# Patient Record
Sex: FEMALE | Race: WHITE | NOT HISPANIC OR LATINO | Employment: UNEMPLOYED | URBAN - METROPOLITAN AREA
[De-identification: names, ages, dates, MRNs, and addresses within clinical notes are randomized per-mention and may not be internally consistent; named-entity substitution may affect disease eponyms.]

---

## 2017-02-03 RX ORDER — LEVOTHYROXINE SODIUM 0.05 MG/1
TABLET ORAL
Qty: 30 TABLET | Refills: 0 | Status: SHIPPED | OUTPATIENT
Start: 2017-02-03 | End: 2017-04-07

## 2017-02-03 RX ORDER — SERTRALINE HYDROCHLORIDE 100 MG/1
TABLET, FILM COATED ORAL
Qty: 30 TABLET | Refills: 0 | Status: SHIPPED | OUTPATIENT
Start: 2017-02-03 | End: 2017-03-01 | Stop reason: SDUPTHER

## 2017-02-08 RX ORDER — LEVOTHYROXINE SODIUM 0.15 MG/1
150 TABLET ORAL DAILY
Qty: 30 TABLET | Refills: 2 | Status: SHIPPED | OUTPATIENT
Start: 2017-02-08 | End: 2017-04-07 | Stop reason: SDUPTHER

## 2017-02-08 NOTE — TELEPHONE ENCOUNTER
----- Message from Ciera Murphy sent at 2/8/2017 11:36 AM EST -----  Contact: [phone]  SYNTHROID WAS SENT IN AS 50 MG AND WAS SUPPOSE TO  MG.  CAN YOU FIX?  NEEDS ASAP    [facility] [phone]

## 2017-03-01 RX ORDER — LISINOPRIL AND HYDROCHLOROTHIAZIDE 25; 20 MG/1; MG/1
TABLET ORAL
Qty: 30 TABLET | Refills: 0 | Status: SHIPPED | OUTPATIENT
Start: 2017-03-01 | End: 2017-04-07 | Stop reason: SDUPTHER

## 2017-03-01 RX ORDER — SERTRALINE HYDROCHLORIDE 100 MG/1
TABLET, FILM COATED ORAL
Qty: 30 TABLET | Refills: 0 | Status: SHIPPED | OUTPATIENT
Start: 2017-03-01 | End: 2017-04-07 | Stop reason: SDUPTHER

## 2017-04-07 ENCOUNTER — TRANSCRIBE ORDERS (OUTPATIENT)
Dept: ADMINISTRATIVE | Facility: HOSPITAL | Age: 62
End: 2017-04-07

## 2017-04-07 ENCOUNTER — OFFICE VISIT (OUTPATIENT)
Dept: INTERNAL MEDICINE | Facility: CLINIC | Age: 62
End: 2017-04-07

## 2017-04-07 VITALS
SYSTOLIC BLOOD PRESSURE: 130 MMHG | BODY MASS INDEX: 31.78 KG/M2 | TEMPERATURE: 98.2 F | HEART RATE: 75 BPM | DIASTOLIC BLOOD PRESSURE: 74 MMHG | OXYGEN SATURATION: 97 % | WEIGHT: 209 LBS | RESPIRATION RATE: 16 BRPM

## 2017-04-07 DIAGNOSIS — R73.09 ELEVATED GLUCOSE: ICD-10-CM

## 2017-04-07 DIAGNOSIS — Z12.31 VISIT FOR SCREENING MAMMOGRAM: Primary | ICD-10-CM

## 2017-04-07 DIAGNOSIS — I10 ESSENTIAL HYPERTENSION: ICD-10-CM

## 2017-04-07 DIAGNOSIS — M25.532 LEFT WRIST PAIN: ICD-10-CM

## 2017-04-07 DIAGNOSIS — E03.9 HYPOTHYROIDISM, UNSPECIFIED TYPE: ICD-10-CM

## 2017-04-07 DIAGNOSIS — J32.0 MAXILLARY SINUSITIS, UNSPECIFIED CHRONICITY: Primary | ICD-10-CM

## 2017-04-07 LAB
ANION GAP SERPL CALCULATED.3IONS-SCNC: 2 MMOL/L (ref 3–11)
BUN BLD-MCNC: 13 MG/DL (ref 9–23)
BUN/CREAT SERPL: 16.3 (ref 7–25)
CALCIUM SPEC-SCNC: 9.7 MG/DL (ref 8.7–10.4)
CHLORIDE SERPL-SCNC: 96 MMOL/L (ref 99–109)
CO2 SERPL-SCNC: 33 MMOL/L (ref 20–31)
CREAT BLD-MCNC: 0.8 MG/DL (ref 0.6–1.3)
DEPRECATED RDW RBC AUTO: 44.6 FL (ref 37–54)
ERYTHROCYTE [DISTWIDTH] IN BLOOD BY AUTOMATED COUNT: 13.5 % (ref 11.3–14.5)
GFR SERPL CREATININE-BSD FRML MDRD: 73 ML/MIN/1.73
GLUCOSE BLD-MCNC: 136 MG/DL (ref 70–100)
HBA1C MFR BLD: 5.8 % (ref 4.8–5.6)
HCT VFR BLD AUTO: 43.7 % (ref 34.5–44)
HGB BLD-MCNC: 14.8 G/DL (ref 11.5–15.5)
MCH RBC QN AUTO: 30.7 PG (ref 27–31)
MCHC RBC AUTO-ENTMCNC: 33.9 G/DL (ref 32–36)
MCV RBC AUTO: 90.7 FL (ref 80–99)
PLATELET # BLD AUTO: 282 10*3/MM3 (ref 150–450)
PMV BLD AUTO: 9.7 FL (ref 6–12)
POTASSIUM BLD-SCNC: 4.5 MMOL/L (ref 3.5–5.5)
RBC # BLD AUTO: 4.82 10*6/MM3 (ref 3.89–5.14)
SODIUM BLD-SCNC: 131 MMOL/L (ref 132–146)
T4 FREE SERPL-MCNC: 1.72 NG/DL (ref 0.89–1.76)
TSH SERPL DL<=0.05 MIU/L-ACNC: 0.43 MIU/ML (ref 0.35–5.35)
WBC NRBC COR # BLD: 10.69 10*3/MM3 (ref 3.5–10.8)

## 2017-04-07 PROCEDURE — 83036 HEMOGLOBIN GLYCOSYLATED A1C: CPT | Performed by: PHYSICIAN ASSISTANT

## 2017-04-07 PROCEDURE — 85027 COMPLETE CBC AUTOMATED: CPT | Performed by: PHYSICIAN ASSISTANT

## 2017-04-07 PROCEDURE — 84443 ASSAY THYROID STIM HORMONE: CPT | Performed by: PHYSICIAN ASSISTANT

## 2017-04-07 PROCEDURE — 80048 BASIC METABOLIC PNL TOTAL CA: CPT | Performed by: PHYSICIAN ASSISTANT

## 2017-04-07 PROCEDURE — 36415 COLL VENOUS BLD VENIPUNCTURE: CPT | Performed by: PHYSICIAN ASSISTANT

## 2017-04-07 PROCEDURE — 84439 ASSAY OF FREE THYROXINE: CPT | Performed by: PHYSICIAN ASSISTANT

## 2017-04-07 PROCEDURE — 99213 OFFICE O/P EST LOW 20 MIN: CPT | Performed by: PHYSICIAN ASSISTANT

## 2017-04-07 RX ORDER — LEVOTHYROXINE SODIUM 0.15 MG/1
150 TABLET ORAL DAILY
Qty: 30 TABLET | Refills: 11 | Status: SHIPPED | OUTPATIENT
Start: 2017-04-07

## 2017-04-07 RX ORDER — CEFDINIR 300 MG/1
300 CAPSULE ORAL 2 TIMES DAILY
Qty: 20 CAPSULE | Refills: 0 | Status: SHIPPED | OUTPATIENT
Start: 2017-04-07 | End: 2017-04-20 | Stop reason: SDUPTHER

## 2017-04-07 RX ORDER — CHLORCYCLIZINE HYDROCHLORIDE AND PSEUDOEPHEDRINE HYDROCHLORIDE 25; 60 MG/1; MG/1
1 TABLET ORAL DAILY
Refills: 0 | COMMUNITY
Start: 2017-04-04 | End: 2017-05-12

## 2017-04-07 RX ORDER — SERTRALINE HYDROCHLORIDE 100 MG/1
100 TABLET, FILM COATED ORAL DAILY
Qty: 30 TABLET | Refills: 5 | Status: SHIPPED | OUTPATIENT
Start: 2017-04-07 | End: 2017-08-18 | Stop reason: SDUPTHER

## 2017-04-07 RX ORDER — LISINOPRIL AND HYDROCHLOROTHIAZIDE 25; 20 MG/1; MG/1
1 TABLET ORAL DAILY
Qty: 30 TABLET | Refills: 5 | Status: SHIPPED | OUTPATIENT
Start: 2017-04-07 | End: 2017-08-18 | Stop reason: SDUPTHER

## 2017-04-07 NOTE — PROGRESS NOTES
Subjective   Carolyn Ragsdale is a 61 y.o. female.   Chief Complaint   Patient presents with   • Cough   • URI   • Med Refill     History of Present Illness   Pt complains of cough that is productive with colored sputum.  Has dental and facial pain x several weeks.    Had bronchitis several months ago and couldn't breath so stopped smoking 2 months ago.    PT complains of left wrist and elbow pain for several weeks.  There is some numbness and tingling.  Denies dropping things.    Needs refills on levothyroxine, zoloft and lisinopril (HTN, hypothyroidism and depression) all controlled.    The following portions of the patient's history were reviewed and updated as appropriate: allergies, current medications and problem list.    Review of Systems   HENT: Positive for congestion.    Respiratory: Positive for cough.        Objective   Physical Exam   Constitutional: She appears well-developed and well-nourished.   HENT:   Head: Normocephalic and atraumatic.   Right Ear: Tympanic membrane and external ear normal.   Left Ear: Tympanic membrane and external ear normal.   Nose: Right sinus exhibits maxillary sinus tenderness and frontal sinus tenderness. Left sinus exhibits maxillary sinus tenderness and frontal sinus tenderness.   Mouth/Throat: No posterior oropharyngeal erythema. No tonsillar exudate.   Eyes: Conjunctivae are normal.   Cardiovascular: Normal rate, regular rhythm and normal heart sounds.  Exam reveals no gallop and no friction rub.    No murmur heard.  Pulmonary/Chest: Effort normal and breath sounds normal.   Psychiatric: She has a normal mood and affect.   Vitals reviewed.      Assessment/Plan   Carolyn was seen today for cough, uri and med refill.    Diagnoses and all orders for this visit:    Maxillary sinusitis, unspecified chronicity  -     cefdinir (OMNICEF) 300 MG capsule; Take 1 capsule by mouth 2 (Two) Times a Day.    Left wrist pain  -     Elastic Bandages & Supports (WRIST BRACE/LEFT MEDIUM)  misc; 1 Units Daily.    Essential hypertension  -     lisinopril-hydrochlorothiazide (PRINZIDE,ZESTORETIC) 20-25 MG per tablet; Take 1 tablet by mouth Daily.    Hypothyroidism, unspecified type  -     TSH  -     T4, Free  -     Basic Metabolic Panel  -     CBC (No Diff)    Other orders  -     sertraline (ZOLOFT) 100 MG tablet; Take 1 tablet by mouth Daily.  -     levothyroxine (SYNTHROID, LEVOTHROID) 150 MCG tablet; Take 1 tablet by mouth Daily.    Gave info on wrist and elbow exercises.  She will wear brace at night.

## 2017-04-07 NOTE — PATIENT INSTRUCTIONS
Generic Wrist Exercises  RANGE OF MOTION (ROM) AND STRETCHING EXERCISES - Wrist Sprain   These exercises may help you when beginning to rehabilitate your injury. Your symptoms may resolve with or without further involvement from your physician, physical therapist, or . While completing these exercises, remember:   · Restoring tissue flexibility helps normal motion to return to the joints. This allows healthier, less painful movement and activity.  · An effective stretch should be held for at least 30 seconds.  · A stretch should never be painful. You should only feel a gentle lengthening or release in the stretched tissue.  RANGE OF MOTION - Wrist Flexion, Active-Assisted  · Extend your right / left elbow with your palm pointing down.*  · Gently pull the back of your hand toward you until you feel a gentle stretch on the top of your forearm.  · Hold this position for __________ seconds.  Repeat __________ times. Complete this exercise __________ times per day.   *If directed by your physician, physical therapist, or , complete this stretch with your elbow bent rather than extended.  RANGE OF MOTION - Wrist Extension, Active-Assisted   · Extend your right / left elbow and turn your palm upward.*  · Gently pull your palm/fingertips back so your wrist extends and your fingers point more toward the ground.  · You should feel a gentle stretch on the inside of your forearm.  · Hold this position for __________ seconds.  Repeat __________ times. Complete this exercise __________ times per day.  *If directed by your physician, physical therapist, or , complete this stretch with your elbow bent, rather than extended.  RANGE OF MOTION - Supination, Active   · Stand or sit with your elbows at your side. Bend your right / left elbow to 90 degrees.  · Turn your palm upward until you feel a gentle stretch on the inside of your forearm.  · Hold this position for __________ seconds.  Slowly release and return to the starting position.  Repeat __________ times. Complete this stretch __________ times per day.   RANGE OF MOTION - Pronation, Active   · Stand or sit with your elbows at your side. Bend your right / left elbow to 90 degrees.  · Turn your palm downward until you feel a gentle stretch on the top of your forearm.  · Hold this position for __________ seconds. Slowly release and return to the starting position.  Repeat __________ times. Complete this stretch __________ times per day.   STRENGTHENING EXERCISES   These exercises may help you when beginning to rehabilitate your injury. They may resolve your symptoms with or without further involvement from your physician, physical therapist, or . While completing these exercises, remember:   · Muscles can gain both the endurance and the strength needed for everyday activities through controlled exercises.  · Complete these exercises as instructed by your physician, physical therapist, or . Progress the resistance and repetitions only as guided.  · You may experience muscle soreness or fatigue, but the pain or discomfort you are trying to eliminate should never worsen during these exercises. If this pain does worsen, stop and make certain you are following the directions exactly. If the pain is still present after adjustments, discontinue the exercise until you can discuss the trouble with your clinician.  STRENGTH - Wrist Flexors  · Sit with your right / left forearm palm-up and fully supported. Your elbow should be resting below the height of your shoulder. Allow your wrist to extend over the edge of the surface.  · Loosely holding a __________ weight or a piece of rubber exercise band/tubing, slowly curl your hand up toward your forearm.  · Hold this position for __________ seconds. Slowly lower the wrist back to the starting position in a controlled manner.  Repeat __________ times. Complete this exercise  "__________ times per day.   STRENGTH - Wrist Extensors  · Sit with your right / left forearm palm-down and fully supported. Your elbow should be resting below the height of your shoulder. Allow your wrist to extend over the edge of the surface.  · Loosely holding a __________ weight or a piece of rubber exercise band/tubing, slowly curl your hand up toward your forearm.  · Hold this position for __________ seconds. Slowly lower the wrist back to the starting position in a controlled manner.  Repeat __________ times. Complete this exercise __________ times per day.   STRENGTH - Forearm Supinators  · Sit with your right / left forearm supported on a table, keeping your elbow below shoulder height. Rest your hand over the edge, palm down.  · Gently  a hammer or a soup ladle.  · Without moving your elbow, slowly turn your palm and hand upward to a \"thumbs-up\" position.  · Hold this position for __________ seconds. Slowly return to the starting position.  Repeat __________ times. Complete this exercise __________ times per day.   STRENGTH - Forearm Pronators   · Sit with your right / left forearm supported on a table, keeping your elbow below shoulder height. Rest your hand over the edge, palm up.  · Gently  a hammer or a soup ladle.  · Without moving your elbow, slowly turn your palm and hand upward to a \"thumbs-up\" position.  · Hold this position for __________ seconds. Slowly return to the starting position.  Repeat __________ times. Complete this exercise __________ times per day.   STRENGTH -   · Grasp a tennis ball, a dense sponge, or a large, rolled sock in your hand.  · Squeeze as hard as you can without increasing any pain.  · Hold this position for __________ seconds. Release your  slowly.  Repeat __________ times. Complete this exercise __________ times per day.      This information is not intended to replace advice given to you by your health care provider. Make sure you discuss any questions " you have with your health care provider.     Document Released: 11/01/2006 Document Revised: 01/08/2016 Document Reviewed: 09/11/2016  [employer] Interactive Patient Education ©2016 [employer]  Lateral Epicondylitis With Rehab  Lateral epicondylitis involves inflammation and pain around the outer portion of the elbow. The pain is caused by inflammation of the tendons in the forearm that bring back (extend) the wrist. Lateral epicondylitis is also called tennis elbow, because it is very common in tennis players. However, it may occur in any individual who extends the wrist repetitively. If lateral epicondylitis is left untreated, it may become a chronic problem.  SYMPTOMS   · Pain, tenderness, and inflammation on the outer (lateral) side of the elbow.  · Pain or weakness with gripping activities.  · Pain that increases with wrist-twisting motions (playing tennis, using a screwdriver, opening a door or a jar).  · Pain with lifting objects, including a coffee cup.  CAUSES   Lateral epicondylitis is caused by inflammation of the tendons that extend the wrist. Causes of injury may include:  · Repetitive stress and strain on the muscles and tendons that extend the wrist.  · Sudden change in activity level or intensity.  · Incorrect  in racquet sports.  · Incorrect  size of racquet (often too large).  · Incorrect hitting position or technique (usually backhand, leading with the elbow).  · Using a racket that is too heavy.  RISK INCREASES WITH:  · Sports or occupations that require repetitive and/or strenuous forearm and wrist movements (tennis, squash, racquetball, carpentry).  · Poor wrist and forearm strength and flexibility.  · Failure to warm up properly before activity.  · Resuming activity before healing, rehabilitation, and conditioning are complete.  PREVENTION   · Warm up and stretch properly before activity.  · Maintain physical fitness:    Strength, flexibility, and endurance.    Cardiovascular  fitness.  · Wear and use properly fitted equipment.  · Learn and use proper technique and have a  correct improper technique.  · Wear a tennis elbow (counterforce) brace.  PROGNOSIS   The course of this condition depends on the degree of the injury. If treated properly, acute cases (symptoms lasting less than 4 weeks) are often resolved in 2 to 6 weeks. Chronic (longer lasting cases) often resolve in 3 to 6 months but may require physical therapy.  RELATED COMPLICATIONS   · Frequently recurring symptoms, resulting in a chronic problem. Properly treating the problem the first time decreases frequency of recurrence.  · Chronic inflammation, scarring tendon degeneration, and partial tendon tear, requiring surgery.  · Delayed healing or resolution of symptoms.  TREATMENT   Treatment first involves the use of ice and medicine to reduce pain and inflammation. Strengthening and stretching exercises may help reduce discomfort if performed regularly. These exercises may be performed at home if the condition is an acute injury. Chronic cases may require a referral to a physical therapist for evaluation and treatment. Your caregiver may advise a corticosteroid injection to help reduce inflammation. Rarely, surgery is needed.  MEDICATION  · If pain medicine is needed, nonsteroidal anti-inflammatory medicines (aspirin and ibuprofen), or other minor pain relievers (acetaminophen), are often advised.  · Do not take pain medicine for 7 days before surgery.  · Prescription pain relievers may be given, if your caregiver thinks they are needed. Use only as directed and only as much as you need.  · Corticosteroid injections may be recommended. These injections should be reserved only for the most severe cases, because they can only be given a certain number of times.  HEAT AND COLD  · Cold treatment (icing) should be applied for 10 to 15 minutes every 2 to 3 hours for inflammation and pain, and immediately after activity that  aggravates your symptoms. Use ice packs or an ice massage.  · Heat treatment may be used before performing stretching and strengthening activities prescribed by your caregiver, physical therapist, or . Use a heat pack or a warm water soak.  SEEK MEDICAL CARE IF:  Symptoms get worse or do not improve in 2 weeks, despite treatment.  EXERCISES   RANGE OF MOTION (ROM) AND STRETCHING EXERCISES - Epicondylitis, Lateral (Tennis Elbow)  These exercises may help you when beginning to rehabilitate your injury. Your symptoms may go away with or without further involvement from your physician, physical therapist, or . While completing these exercises, remember:   · Restoring tissue flexibility helps normal motion to return to the joints. This allows healthier, less painful movement and activity.  · An effective stretch should be held for at least 30 seconds.  · A stretch should never be painful. You should only feel a gentle lengthening or release in the stretched tissue.  RANGE OF MOTION - Wrist Flexion, Active-Assisted  · Extend your right / left elbow with your fingers pointing down.*  · Gently pull the back of your hand towards you, until you feel a gentle stretch on the top of your forearm.  · Hold this position for __________ seconds.  Repeat __________ times. Complete this exercise __________ times per day.   *If directed by your physician, physical therapist or , complete this stretch with your elbow bent, rather than extended.  RANGE OF MOTION - Wrist Extension, Active-Assisted  · Extend your right / left elbow and turn your palm upwards.*  · Gently pull your palm and fingertips back, so your wrist extends and your fingers point more toward the ground.  · You should feel a gentle stretch on the inside of your forearm.  · Hold this position for __________ seconds.  Repeat __________ times. Complete this exercise __________ times per day.  *If directed by your physician,  physical therapist or , complete this stretch with your elbow bent, rather than extended.  STRETCH - Wrist Flexion  · Place the back of your right / left hand on a tabletop, leaving your elbow slightly bent. Your fingers should point away from your body.  · Gently press the back of your hand down onto the table by straightening your elbow. You should feel a stretch on the top of your forearm.  · Hold this position for __________ seconds.  Repeat __________ times. Complete this stretch __________ times per day.   STRETCH - Wrist Extension   · Place your right / left fingertips on a tabletop, leaving your elbow slightly bent. Your fingers should point backwards.  · Gently press your fingers and palm down onto the table by straightening your elbow. You should feel a stretch on the inside of your forearm.  · Hold this position for __________ seconds.  Repeat __________ times. Complete this stretch __________ times per day.   STRENGTHENING EXERCISES - Epicondylitis, Lateral (Tennis Elbow)  These exercises may help you when beginning to rehabilitate your injury. They may resolve your symptoms with or without further involvement from your physician, physical therapist, or . While completing these exercises, remember:   · Muscles can gain both the endurance and the strength needed for everyday activities through controlled exercises.  · Complete these exercises as instructed by your physician, physical therapist or . Increase the resistance and repetitions only as guided.  · You may experience muscle soreness or fatigue, but the pain or discomfort you are trying to eliminate should never worsen during these exercises. If this pain does get worse, stop and make sure you are following the directions exactly. If the pain is still present after adjustments, discontinue the exercise until you can discuss the trouble with your caregiver.  STRENGTH - Wrist Flexors  · Sit with your  right / left forearm palm-up and fully supported on a table or countertop. Your elbow should be resting below the height of your shoulder. Allow your wrist to extend over the edge of the surface.  · Loosely holding a __________ weight, or a piece of rubber exercise band or tubing, slowly curl your hand up toward your forearm.  · Hold this position for __________ seconds. Slowly lower the wrist back to the starting position in a controlled manner.  Repeat __________ times. Complete this exercise __________ times per day.   STRENGTH - Wrist Extensors  · Sit with your right / left forearm palm-down and fully supported on a table or countertop. Your elbow should be resting below the height of your shoulder. Allow your wrist to extend over the edge of the surface.  · Loosely holding a __________ weight, or a piece of rubber exercise band or tubing, slowly curl your hand up toward your forearm.  · Hold this position for __________ seconds. Slowly lower the wrist back to the starting position in a controlled manner.  Repeat __________ times. Complete this exercise __________ times per day.   STRENGTH - Ulnar Deviators  · Stand with a ____________________ weight in your right / left hand, or sit while holding a rubber exercise band or tubing, with your healthy arm supported on a table or countertop.  · Move your wrist, so that your pinkie travels toward your forearm and your thumb moves away from your forearm.  · Hold this position for __________ seconds and then slowly lower the wrist back to the starting position.  Repeat __________ times. Complete this exercise __________ times per day  STRENGTH - Radial Deviators  · Stand with a ____________________ weight in your right / left hand, or sit while holding a rubber exercise band or tubing, with your injured arm supported on a table or countertop.  · Raise your hand upward in front of you or pull up on the rubber tubing.  · Hold this position for __________ seconds and then  "slowly lower the wrist back to the starting position.  Repeat __________ times. Complete this exercise __________ times per day.  STRENGTH - Forearm Supinators   · Sit with your right / left forearm supported on a table, keeping your elbow below shoulder height. Rest your hand over the edge, palm down.  · Gently  a hammer or a soup ladle.  · Without moving your elbow, slowly turn your palm and hand upward to a \"thumbs-up\" position.  · Hold this position for __________ seconds. Slowly return to the starting position.  Repeat __________ times. Complete this exercise __________ times per day.   STRENGTH - Forearm Pronators   · Sit with your right / left forearm supported on a table, keeping your elbow below shoulder height. Rest your hand over the edge, palm up.  · Gently  a hammer or a soup ladle.  · Without moving your elbow, slowly turn your palm and hand upward to a \"thumbs-up\" position.  · Hold this position for __________ seconds. Slowly return to the starting position.  Repeat __________ times. Complete this exercise __________ times per day.   STRENGTH -   · Grasp a tennis ball, a dense sponge, or a large, rolled sock in your hand.  · Squeeze as hard as you can, without increasing any pain.  · Hold this position for __________ seconds. Release your  slowly.  Repeat __________ times. Complete this exercise __________ times per day.   STRENGTH - Elbow Extensors, Isometric  · Stand or sit upright, on a firm surface. Place your right / left arm so that your palm faces your stomach, and it is at the height of your waist.  · Place your opposite hand on the underside of your forearm. Gently push up as your right / left arm resists. Push as hard as you can with both arms, without causing any pain or movement at your right / left elbow. Hold this stationary position for __________ seconds.  Gradually release the tension in both arms. Allow your muscles to relax completely before repeating.     This " information is not intended to replace advice given to you by your health care provider. Make sure you discuss any questions you have with your health care provider.     Document Released: 12/18/2006 Document Revised: 01/08/2016 Document Reviewed: 09/15/2016  [employer] Interactive Patient Education ©2016 [employer]

## 2017-04-11 ENCOUNTER — HOSPITAL ENCOUNTER (OUTPATIENT)
Dept: MAMMOGRAPHY | Facility: HOSPITAL | Age: 62
Discharge: HOME | End: 2017-04-11
Attending: OBSTETRICS & GYNECOLOGY | Admitting: OBSTETRICS & GYNECOLOGY

## 2017-04-11 DIAGNOSIS — Z12.31 VISIT FOR SCREENING MAMMOGRAM: ICD-10-CM

## 2017-04-11 PROCEDURE — 77063 BREAST TOMOSYNTHESIS BI: CPT

## 2017-04-11 PROCEDURE — 77067 SCR MAMMO BI INCL CAD: CPT | Performed by: RADIOLOGY

## 2017-04-11 PROCEDURE — G0202 SCR MAMMO BI INCL CAD: HCPCS

## 2017-04-11 PROCEDURE — 77063 BREAST TOMOSYNTHESIS BI: CPT | Performed by: RADIOLOGY

## 2017-04-19 ENCOUNTER — TELEPHONE (OUTPATIENT)
Dept: INTERNAL MEDICINE | Facility: CLINIC | Age: 62
End: 2017-04-19

## 2017-04-19 DIAGNOSIS — J32.0 MAXILLARY SINUSITIS, UNSPECIFIED CHRONICITY: ICD-10-CM

## 2017-04-19 NOTE — TELEPHONE ENCOUNTER
Spoke with pt and she states that the last dose helped out a little bit but did not clear up her symptoms, pt is still coughing up phlem. Pt's facial swelling is better.

## 2017-04-19 NOTE — TELEPHONE ENCOUNTER
So she continues to have sinus issues?  Pls find out more details.  Did she get better with the first rx?  IS the facial pain better?

## 2017-04-19 NOTE — TELEPHONE ENCOUNTER
----- Message from Josie Arcos sent at 4/18/2017  3:52 PM EDT -----  Pt calling for refill Rx cefdinir 300 mg      she can be reached [phone]    Pharm:  [facility] [address]

## 2017-04-19 NOTE — TELEPHONE ENCOUNTER
Please be advise that pt just had filled on 4/7/17 with a 20 day supply, do you still want pt to continue Rx?

## 2017-04-20 RX ORDER — CEFDINIR 300 MG/1
300 CAPSULE ORAL 2 TIMES DAILY
Qty: 8 CAPSULE | Refills: 0 | Status: SHIPPED | OUTPATIENT
Start: 2017-04-20 | End: 2017-05-01

## 2017-04-20 NOTE — TELEPHONE ENCOUNTER
Sent in another 4 days of cefdinir.  If mucus is clear, then this won't do that much more.  It will clear up the color of the mucus.  Is she using mucinex to thin secretions?

## 2017-04-25 ENCOUNTER — TELEPHONE (OUTPATIENT)
Dept: INTERNAL MEDICINE | Facility: CLINIC | Age: 62
End: 2017-04-25

## 2017-04-25 NOTE — TELEPHONE ENCOUNTER
Spoke with pt and she did not receive the second dose of Rx Cefdinir 300 mg. Pharmacy is wanting to do a PA. Pt is still having the same symptoms(coughing, congestion). Do you want pt to come back in to be seen or do you want to send in another abx?

## 2017-04-25 NOTE — TELEPHONE ENCOUNTER
----- Message from Josie Arcos sent at 4/25/2017  9:03 AM EDT -----  Pt returning your call    [phone]

## 2017-04-26 NOTE — TELEPHONE ENCOUNTER
Spoke with pt and she stated that she is not taking mucinex. Advised pt to start taking mucinex and that I will contact her back once you decide on what pt should be doing. Pt verbalized understanding.

## 2017-04-26 NOTE — TELEPHONE ENCOUNTER
If the mucus color has become more clear, then this could the viral aspect of her bronchitis.  Is she taking mucinex?

## 2017-04-26 NOTE — TELEPHONE ENCOUNTER
i sent you a message just recently that if her mucus is clear, then this is viral and should resolve with mucinex and some more time.  If she is short of breath, then she needs to be seen.

## 2017-04-27 ENCOUNTER — HOSPITAL ENCOUNTER (OUTPATIENT)
Dept: MAMMOGRAPHY | Facility: HOSPITAL | Age: 62
Discharge: HOME | End: 2017-04-27
Admitting: OBSTETRICS & GYNECOLOGY

## 2017-04-27 DIAGNOSIS — R92.8 ABNORMAL MAMMOGRAM: ICD-10-CM

## 2017-04-27 PROCEDURE — G0206 DX MAMMO INCL CAD UNI: HCPCS

## 2017-04-27 PROCEDURE — 77061 BREAST TOMOSYNTHESIS UNI: CPT | Performed by: RADIOLOGY

## 2017-04-27 PROCEDURE — G0279 TOMOSYNTHESIS, MAMMO: HCPCS

## 2017-04-27 PROCEDURE — 77065 DX MAMMO INCL CAD UNI: CPT | Performed by: RADIOLOGY

## 2017-04-27 NOTE — TELEPHONE ENCOUNTER
Spoke with patient she stated that she was not short of breath but that her drainage did have a slightly yellow color. Patient stated that she did start the mucinex. Advised patient that she probably needed to come in and be seen. Patient stated that she wanted to wait until Monday and see if she felt any better over the weekend. Advised patient to call on Monday and make an appt if no better.

## 2017-05-01 ENCOUNTER — OFFICE VISIT (OUTPATIENT)
Dept: INTERNAL MEDICINE | Facility: CLINIC | Age: 62
End: 2017-05-01

## 2017-05-01 VITALS
TEMPERATURE: 97.3 F | WEIGHT: 209 LBS | HEART RATE: 62 BPM | OXYGEN SATURATION: 95 % | BODY MASS INDEX: 31.78 KG/M2 | RESPIRATION RATE: 16 BRPM | DIASTOLIC BLOOD PRESSURE: 74 MMHG | SYSTOLIC BLOOD PRESSURE: 122 MMHG

## 2017-05-01 DIAGNOSIS — J32.9 SINUSITIS, UNSPECIFIED CHRONICITY, UNSPECIFIED LOCATION: Primary | ICD-10-CM

## 2017-05-01 DIAGNOSIS — J20.9 ACUTE BRONCHITIS, UNSPECIFIED ORGANISM: ICD-10-CM

## 2017-05-01 DIAGNOSIS — J30.2 SEASONAL ALLERGIC RHINITIS, UNSPECIFIED ALLERGIC RHINITIS TRIGGER: ICD-10-CM

## 2017-05-01 PROCEDURE — 99213 OFFICE O/P EST LOW 20 MIN: CPT | Performed by: PHYSICIAN ASSISTANT

## 2017-05-01 PROCEDURE — 96372 THER/PROPH/DIAG INJ SC/IM: CPT | Performed by: PHYSICIAN ASSISTANT

## 2017-05-01 RX ORDER — TRIAMCINOLONE ACETONIDE 40 MG/ML
40 INJECTION, SUSPENSION INTRA-ARTICULAR; INTRAMUSCULAR ONCE
Status: COMPLETED | OUTPATIENT
Start: 2017-05-01 | End: 2017-05-01

## 2017-05-01 RX ORDER — LEVOFLOXACIN 500 MG/1
500 TABLET, FILM COATED ORAL DAILY
Qty: 10 TABLET | Refills: 0 | Status: SHIPPED | OUTPATIENT
Start: 2017-05-01 | End: 2017-05-03 | Stop reason: SDUPTHER

## 2017-05-01 RX ADMIN — TRIAMCINOLONE ACETONIDE 40 MG: 40 INJECTION, SUSPENSION INTRA-ARTICULAR; INTRAMUSCULAR at 14:43

## 2017-05-03 ENCOUNTER — TELEPHONE (OUTPATIENT)
Dept: INTERNAL MEDICINE | Facility: CLINIC | Age: 62
End: 2017-05-03

## 2017-05-03 DIAGNOSIS — J32.9 SINUSITIS, UNSPECIFIED CHRONICITY, UNSPECIFIED LOCATION: ICD-10-CM

## 2017-05-03 DIAGNOSIS — J20.9 ACUTE BRONCHITIS, UNSPECIFIED ORGANISM: ICD-10-CM

## 2017-05-03 RX ORDER — LEVOFLOXACIN 500 MG/1
500 TABLET, FILM COATED ORAL DAILY
Qty: 10 TABLET | Refills: 0 | Status: SHIPPED | OUTPATIENT
Start: 2017-05-03

## 2017-05-12 ENCOUNTER — TELEPHONE (OUTPATIENT)
Dept: INTERNAL MEDICINE | Facility: CLINIC | Age: 62
End: 2017-05-12

## 2017-05-12 RX ORDER — PSEUDOEPHEDRINE HCL 120 MG/1
120 TABLET, FILM COATED, EXTENDED RELEASE ORAL EVERY 12 HOURS
Qty: 60 TABLET | Refills: 0 | Status: SHIPPED | OUTPATIENT
Start: 2017-05-12 | End: 2017-07-05 | Stop reason: SDUPTHER

## 2017-07-05 RX ORDER — PSEUDOEPHEDRINE HYDROCHLORIDE 120 MG/1
TABLET, FILM COATED, EXTENDED RELEASE ORAL
Qty: 60 TABLET | Refills: 0 | Status: SHIPPED | OUTPATIENT
Start: 2017-07-05 | End: 2017-09-01 | Stop reason: SDUPTHER

## 2017-08-18 ENCOUNTER — TELEPHONE (OUTPATIENT)
Dept: INTERNAL MEDICINE | Facility: CLINIC | Age: 62
End: 2017-08-18

## 2017-08-18 DIAGNOSIS — I10 ESSENTIAL HYPERTENSION: ICD-10-CM

## 2017-08-18 RX ORDER — SERTRALINE HYDROCHLORIDE 100 MG/1
100 TABLET, FILM COATED ORAL DAILY
Qty: 30 TABLET | Refills: 5 | Status: SHIPPED | OUTPATIENT
Start: 2017-08-18 | End: 2017-12-14 | Stop reason: SDUPTHER

## 2017-08-18 RX ORDER — LISINOPRIL AND HYDROCHLOROTHIAZIDE 25; 20 MG/1; MG/1
1 TABLET ORAL DAILY
Qty: 30 TABLET | Refills: 5 | Status: SHIPPED | OUTPATIENT
Start: 2017-08-18 | End: 2017-12-05 | Stop reason: SDUPTHER

## 2017-08-18 NOTE — TELEPHONE ENCOUNTER
Refilled Rx Zoloft and Lisinopril per chart via fax to [facility] as requested. LVM informing pt that Rx has been sent to pharmacy as requested. Office # given if she should need anything else.

## 2017-08-18 NOTE — TELEPHONE ENCOUNTER
----- Message from Carolyn Thompson sent at 8/17/2017  4:42 PM EDT -----  [phone]  PT IS MOVING OUT OF STATE AND NEEDS TO FIND  BUT NEEDS US TO CALL IN ZOLOFT AND LISINOPRIL FOR A 6 MTH SUPPLY TO [facility] [address]

## 2017-09-01 RX ORDER — PSEUDOEPHEDRINE HYDROCHLORIDE 120 MG/1
TABLET, FILM COATED, EXTENDED RELEASE ORAL
Qty: 60 TABLET | Refills: 0 | Status: SHIPPED | OUTPATIENT
Start: 2017-09-01 | End: 2017-11-07 | Stop reason: SDUPTHER

## 2017-09-15 RX ORDER — LEVOTHYROXINE SODIUM 0.15 MG/1
TABLET ORAL
Qty: 30 TABLET | Refills: 2 | Status: SHIPPED | OUTPATIENT
Start: 2017-09-15 | End: 2017-12-14 | Stop reason: SDUPTHER

## 2017-11-08 RX ORDER — PSEUDOEPHEDRINE HYDROCHLORIDE 120 MG/1
TABLET, FILM COATED, EXTENDED RELEASE ORAL
Qty: 60 TABLET | Refills: 0 | Status: SHIPPED | OUTPATIENT
Start: 2017-11-08 | End: 2017-12-16 | Stop reason: SDUPTHER

## 2017-12-03 ENCOUNTER — TELEPHONE (OUTPATIENT)
Dept: INTERNAL MEDICINE | Facility: CLINIC | Age: 62
End: 2017-12-03

## 2017-12-03 DIAGNOSIS — I10 ESSENTIAL HYPERTENSION: ICD-10-CM

## 2017-12-03 NOTE — TELEPHONE ENCOUNTER
----- Message from Deborah David sent at 12/1/2017  1:05 PM EST -----  Patient called requesting a prescription refill.     Prescription needed: Lisinopril 25MG    Pharmacy: [facility]     Call back number for patient: [phone]    Thank you.

## 2017-12-03 NOTE — TELEPHONE ENCOUNTER
Please call in 6 month supply (Either 1 month with RF 5 or 3 months with RF 1).  Dominick Franks MD  6:09 PM  12/03/17

## 2017-12-04 NOTE — TELEPHONE ENCOUNTER
On patients chart we have patient taking lisinopril HCTZ 20-25 mg . Did you want lisinopril 25 mg sent in ?

## 2017-12-04 NOTE — TELEPHONE ENCOUNTER
Lisinopril/hctz 20/25 that she has been taking.  Once with one refill.  She well need an appointment before further refills.

## 2017-12-05 RX ORDER — LISINOPRIL AND HYDROCHLOROTHIAZIDE 25; 20 MG/1; MG/1
1 TABLET ORAL DAILY
Qty: 30 TABLET | Refills: 1 | Status: SHIPPED | OUTPATIENT
Start: 2017-12-05 | End: 2018-03-26 | Stop reason: SDUPTHER

## 2017-12-14 ENCOUNTER — TELEPHONE (OUTPATIENT)
Dept: INTERNAL MEDICINE | Facility: CLINIC | Age: 62
End: 2017-12-14

## 2017-12-14 RX ORDER — LEVOTHYROXINE SODIUM 0.15 MG/1
150 TABLET ORAL DAILY
Qty: 30 TABLET | Refills: 2 | Status: SHIPPED | OUTPATIENT
Start: 2017-12-14 | End: 2018-03-15 | Stop reason: SDUPTHER

## 2017-12-14 RX ORDER — SERTRALINE HYDROCHLORIDE 100 MG/1
100 TABLET, FILM COATED ORAL DAILY
Qty: 30 TABLET | Refills: 5 | Status: SHIPPED | OUTPATIENT
Start: 2017-12-14

## 2017-12-14 NOTE — TELEPHONE ENCOUNTER
----- Message from Carolyn Thompson sent at 12/14/2017  3:32 PM EST -----  Pt [phone]  REFILL ON SYNTHROID, ZOLOFT AND SUDAFED   [facility]

## 2017-12-18 RX ORDER — PSEUDOEPHEDRINE HYDROCHLORIDE 120 MG/1
TABLET, FILM COATED, EXTENDED RELEASE ORAL
Qty: 180 TABLET | Refills: 0 | Status: SHIPPED | OUTPATIENT
Start: 2017-12-18

## 2018-03-15 RX ORDER — LEVOTHYROXINE SODIUM 0.15 MG/1
150 TABLET ORAL DAILY
Qty: 30 TABLET | Refills: 0 | Status: SHIPPED | OUTPATIENT
Start: 2018-03-15

## 2018-03-24 DIAGNOSIS — I10 ESSENTIAL HYPERTENSION: ICD-10-CM

## 2018-03-26 ENCOUNTER — TELEPHONE (OUTPATIENT)
Dept: INTERNAL MEDICINE | Facility: CLINIC | Age: 63
End: 2018-03-26

## 2018-03-26 DIAGNOSIS — I10 ESSENTIAL HYPERTENSION: ICD-10-CM

## 2018-03-26 RX ORDER — LISINOPRIL AND HYDROCHLOROTHIAZIDE 25; 20 MG/1; MG/1
TABLET ORAL
Qty: 30 TABLET | Refills: 0 | Status: SHIPPED | OUTPATIENT
Start: 2018-03-26

## 2018-03-26 RX ORDER — LISINOPRIL AND HYDROCHLOROTHIAZIDE 25; 20 MG/1; MG/1
1 TABLET ORAL DAILY
Qty: 30 TABLET | Refills: 1 | Status: SHIPPED | OUTPATIENT
Start: 2018-03-26

## 2018-03-26 NOTE — TELEPHONE ENCOUNTER
----- Message from Layne Dorantes V sent at 3/23/2018  4:18 PM EDT -----  PT BOY EUNICE KELSEY PHILLIP, CALLED AND STATED PT MOVED TO [place] AND HAS A PCP APPT IN April. HOWEVER, PT IS OUT OF HER BLOOD PRESSURE MEDICATION ON Sunday AND NEEDS A REFILL TILL SHE SEES HER NEW PCP.    SHE CAN BE REACHED [phone]    [address] [phone] [facility] [phone]